# Patient Record
Sex: MALE | Race: WHITE | NOT HISPANIC OR LATINO | URBAN - METROPOLITAN AREA
[De-identification: names, ages, dates, MRNs, and addresses within clinical notes are randomized per-mention and may not be internally consistent; named-entity substitution may affect disease eponyms.]

---

## 2020-02-12 PROBLEM — Z00.00 ENCOUNTER FOR PREVENTIVE HEALTH EXAMINATION: Status: ACTIVE | Noted: 2020-02-12

## 2020-02-25 ENCOUNTER — EMERGENCY (EMERGENCY)
Facility: HOSPITAL | Age: 50
LOS: 1 days | End: 2020-02-25
Admitting: EMERGENCY MEDICINE
Payer: COMMERCIAL

## 2020-02-25 PROCEDURE — 73590 X-RAY EXAM OF LOWER LEG: CPT | Mod: 26,LT

## 2020-02-25 PROCEDURE — 99285 EMERGENCY DEPT VISIT HI MDM: CPT

## 2020-02-25 PROCEDURE — 93971 EXTREMITY STUDY: CPT | Mod: 26,LT

## 2023-02-22 ENCOUNTER — NON-APPOINTMENT (OUTPATIENT)
Age: 53
End: 2023-02-22

## 2023-02-22 ENCOUNTER — APPOINTMENT (OUTPATIENT)
Dept: CARDIOLOGY | Facility: CLINIC | Age: 53
End: 2023-02-22
Payer: COMMERCIAL

## 2023-02-22 VITALS
SYSTOLIC BLOOD PRESSURE: 130 MMHG | DIASTOLIC BLOOD PRESSURE: 82 MMHG | BODY MASS INDEX: 40.43 KG/M2 | OXYGEN SATURATION: 95 % | HEIGHT: 74 IN | HEART RATE: 74 BPM | WEIGHT: 315 LBS

## 2023-02-22 VITALS — SYSTOLIC BLOOD PRESSURE: 138 MMHG | DIASTOLIC BLOOD PRESSURE: 84 MMHG

## 2023-02-22 DIAGNOSIS — Z02.82 ENCOUNTER FOR ADOPTION SERVICES: ICD-10-CM

## 2023-02-22 DIAGNOSIS — Z78.9 OTHER SPECIFIED HEALTH STATUS: ICD-10-CM

## 2023-02-22 PROCEDURE — 99214 OFFICE O/P EST MOD 30 MIN: CPT | Mod: 25

## 2023-02-22 PROCEDURE — 93000 ELECTROCARDIOGRAM COMPLETE: CPT

## 2023-02-22 RX ORDER — LISINOPRIL AND HYDROCHLOROTHIAZIDE TABLETS 20; 12.5 MG/1; MG/1
20-12.5 TABLET ORAL DAILY
Qty: 180 | Refills: 3 | Status: ACTIVE | COMMUNITY
Start: 1900-01-01 | End: 1900-01-01

## 2023-02-22 NOTE — HISTORY OF PRESENT ILLNESS
[FreeTextEntry1] : 52 year old obese male with PMHx of HTN, HLD, SUMAN and not utilizing CPAP, lower extremity edema, presented to [facility] [place], October 2022, with chest pain. He thought the pain was originally do to a pulled muscle when he was fishing, however the pain radiated to his left shoulder/scapula area and persisted as a dull constant ache so he went to ED for further evaluation. There he had an ECG, troponins, echocardiogram. No acute findings and ruled out for an MI. Chest pain largely resolved. He had one episode a few weeks ago that resolved quickly.\par \par There is no history of MI, CVA, CHF, or previous coronary intervention.\par

## 2023-02-22 NOTE — DISCUSSION/SUMMARY
[FreeTextEntry1] : 1. Chest Pain/Abnormal ECG: advised exercise nuclear stress testing. Echocardiogram, October 2022, revealed normal LV systolic function, no significant valvular disease and borderline dilatation of RV, likely consistent with untreated SUMAN.\par \par 2. HTN: remains above goal. Goal BP less than 130/80. Recommend low salt diet. Recommend increasing lisinopril/HCTZ 20/12.5mg to 2 pills daily. Weight loss of utmost importance. \par \par 3. HLD: reviewed labs with patient from October 2022. States the statin Rx never came through from the hospital discharge. Recommend starting rosuvastatin 20mg daily.\par \par 4. Lower Extremity Edema: Lasix 20mg PRN.\par \par Follow up after testing. [EKG obtained to assist in diagnosis and management of assessed problem(s)] : EKG obtained to assist in diagnosis and management of assessed problem(s)

## 2023-02-22 NOTE — PHYSICAL EXAM
[Normal] : moves all extremities, no focal deficits, normal speech [de-identified] : No carotid bruits auscultated bilaterally [de-identified] : bilateral lower extremity edema and mild erythema

## 2023-02-22 NOTE — CARDIOLOGY SUMMARY
[de-identified] : 2/22/2023, NSR with RBBB and right axis deviation [de-identified] : 10/15/2022, LV EF 55-60%, borderline RV dilatation, mildly dilated LA, trace AI, trace TR with estimated PASP of 16mmHg.

## 2023-03-06 ENCOUNTER — APPOINTMENT (OUTPATIENT)
Dept: CARDIOLOGY | Facility: CLINIC | Age: 53
End: 2023-03-06
Payer: COMMERCIAL

## 2023-03-06 PROCEDURE — A9502: CPT

## 2023-03-06 PROCEDURE — 93015 CV STRESS TEST SUPVJ I&R: CPT

## 2023-03-06 PROCEDURE — 78452 HT MUSCLE IMAGE SPECT MULT: CPT

## 2023-03-08 ENCOUNTER — APPOINTMENT (OUTPATIENT)
Dept: CARDIOLOGY | Facility: CLINIC | Age: 53
End: 2023-03-08
Payer: COMMERCIAL

## 2023-03-08 VITALS
SYSTOLIC BLOOD PRESSURE: 146 MMHG | WEIGHT: 315 LBS | BODY MASS INDEX: 40.43 KG/M2 | HEIGHT: 74 IN | DIASTOLIC BLOOD PRESSURE: 90 MMHG | HEART RATE: 86 BPM | OXYGEN SATURATION: 96 %

## 2023-03-08 DIAGNOSIS — R94.31 ABNORMAL ELECTROCARDIOGRAM [ECG] [EKG]: ICD-10-CM

## 2023-03-08 DIAGNOSIS — R07.9 CHEST PAIN, UNSPECIFIED: ICD-10-CM

## 2023-03-08 DIAGNOSIS — R94.39 ABNORMAL RESULT OF OTHER CARDIOVASCULAR FUNCTION STUDY: ICD-10-CM

## 2023-03-08 PROCEDURE — 99215 OFFICE O/P EST HI 40 MIN: CPT

## 2023-03-08 NOTE — DISCUSSION/SUMMARY
[FreeTextEntry1] : 1. Chest Pain/Abnormal ECG/Abnormal Nuclear Stress Testing: Patient underwent exercise nuclear stress testing on 3/6/2023. SPECT images demonstrated defects. Although these defects could be caused by artifact given patient's body habitus, I am recommending a left heart catheterization to define the coronary artery anatomy given patient's multitude of risk factors for underlying CAD (HTN, obesity, untreated SUMAN, HLD, male gender). Patient agrees and wishes to proceed.  Echocardiogram, October 2022, revealed normal LV systolic function, no significant valvular disease and borderline dilatation of RV, likely consistent with untreated SUMAN.\par \par 2. HTN: Goal BP less than 130/80. Recommend low salt diet. Recommend continuing lisinopril/HCTZ 20/12.5mg to 2 pills daily. Weight loss of utmost importance. \par \par 3. HLD: reviewed labs with patient from October 2022. Continue rosuvastatin 20mg daily.\par \par 4. Lower Extremity Edema: Lasix 20mg PRN.\par \par Patient will start Aspirin 81mg daily.\par

## 2023-03-08 NOTE — HISTORY OF PRESENT ILLNESS
[FreeTextEntry1] : 52 year old obese male with PMHx of HTN, HLD, SUMAN and not utilizing CPAP, lower extremity edema, presented to [facility] [place], October 2022, with chest pain. He thought the pain was originally do to a pulled muscle when he was fishing, however the pain radiated to his left shoulder/scapula area and persisted as a dull constant ache so he went to ED for further evaluation. There he had an ECG, troponins, echocardiogram. No acute findings and ruled out for an MI. Chest pain largely resolved. He had one episode a few weeks ago that resolved quickly.\par \par There is no history of MI, CVA, CHF, or previous coronary intervention.\par

## 2023-03-08 NOTE — PHYSICAL EXAM
[Normal] : normal gait [de-identified] : No carotid bruits auscultated bilaterally [de-identified] : bilateral lower extremity edema and mild erythema

## 2023-03-08 NOTE — CARDIOLOGY SUMMARY
[de-identified] : 2/22/2023, NSR with RBBB and right axis deviation [de-identified] : 3/6/2023, Exercise Nuclear Stress Testing: exercised for 7 minutes utilizing standard Ankur Protocol. No ECG changes to suggest ischemia, however SPECT images demonstrated fixed inferior wall defect and reversible anterior wall defect. [de-identified] : 10/15/2022, LV EF 55-60%, borderline RV dilatation, mildly dilated LA, trace AI, trace TR with estimated PASP of 16mmHg.

## 2023-03-13 ENCOUNTER — NON-APPOINTMENT (OUTPATIENT)
Age: 53
End: 2023-03-13

## 2023-03-20 ENCOUNTER — APPOINTMENT (OUTPATIENT)
Dept: CARDIOLOGY | Facility: CLINIC | Age: 53
End: 2023-03-20
Payer: COMMERCIAL

## 2023-03-20 VITALS
TEMPERATURE: 97.1 F | HEART RATE: 56 BPM | BODY MASS INDEX: 40.43 KG/M2 | HEIGHT: 74 IN | DIASTOLIC BLOOD PRESSURE: 80 MMHG | WEIGHT: 315 LBS | OXYGEN SATURATION: 98 % | SYSTOLIC BLOOD PRESSURE: 136 MMHG

## 2023-03-20 PROCEDURE — 99214 OFFICE O/P EST MOD 30 MIN: CPT

## 2023-03-20 RX ORDER — ROSUVASTATIN CALCIUM 40 MG/1
40 TABLET, FILM COATED ORAL
Qty: 30 | Refills: 0 | Status: DISCONTINUED | COMMUNITY
Start: 2023-03-17

## 2023-03-20 RX ORDER — ROSUVASTATIN CALCIUM 20 MG/1
20 TABLET, FILM COATED ORAL
Qty: 90 | Refills: 3 | Status: DISCONTINUED | COMMUNITY
Start: 2023-02-22 | End: 2023-03-20

## 2023-03-20 NOTE — ASSESSMENT
[FreeTextEntry1] : CAD: The patient underwent invasive coronary angiography.  Nonobstructive disease was found.  Right radial site is healing well.\par \par Hypertriglyceridemia: Patient's triglycerides were over thousand.  He was started on rosuvastatin about 2 weeks ago.  Rosuvastatin has been discontinued.  Fenofibrate 145 mg daily has been started.  Repeat LFTs and cholesterol profile have been arranged for 6 weeks time.  The patient has been advised to abstain from alcohol.  Dietary issues have been reviewed.  The patient was will have follow-up LFTs and cholesterol profile in 6 weeks along with thyroid function testing.\par \par Hypertension: Continue current therapy.

## 2023-03-28 ENCOUNTER — NON-APPOINTMENT (OUTPATIENT)
Age: 53
End: 2023-03-28

## 2023-04-13 ENCOUNTER — NON-APPOINTMENT (OUTPATIENT)
Age: 53
End: 2023-04-13

## 2023-04-13 ENCOUNTER — APPOINTMENT (OUTPATIENT)
Dept: CARDIOLOGY | Facility: CLINIC | Age: 53
End: 2023-04-13
Payer: COMMERCIAL

## 2023-04-13 PROCEDURE — 93880 EXTRACRANIAL BILAT STUDY: CPT

## 2023-04-25 ENCOUNTER — APPOINTMENT (OUTPATIENT)
Dept: VASCULAR SURGERY | Facility: CLINIC | Age: 53
End: 2023-04-25
Payer: COMMERCIAL

## 2023-04-25 VITALS
HEIGHT: 74 IN | BODY MASS INDEX: 40.43 KG/M2 | WEIGHT: 315 LBS | DIASTOLIC BLOOD PRESSURE: 64 MMHG | SYSTOLIC BLOOD PRESSURE: 132 MMHG

## 2023-04-25 DIAGNOSIS — Z77.22 CONTACT WITH AND (SUSPECTED) EXPOSURE TO ENVIRONMENTAL TOBACCO SMOKE (ACUTE) (CHRONIC): ICD-10-CM

## 2023-04-25 PROCEDURE — 99203 OFFICE O/P NEW LOW 30 MIN: CPT

## 2023-04-25 RX ORDER — FUROSEMIDE 20 MG/1
20 TABLET ORAL DAILY
Refills: 0 | Status: DISCONTINUED | COMMUNITY
End: 2023-04-25

## 2023-04-25 RX ORDER — ICOSAPENT ETHYL 1 G/1
1 CAPSULE ORAL DAILY
Qty: 180 | Refills: 3 | Status: DISCONTINUED | COMMUNITY
Start: 2023-03-17 | End: 2023-04-25

## 2023-04-25 RX ORDER — FENOFIBRATE 145 MG/1
145 TABLET, COATED ORAL DAILY
Qty: 90 | Refills: 3 | Status: DISCONTINUED | COMMUNITY
Start: 2023-03-20 | End: 2023-04-25

## 2023-04-25 NOTE — HISTORY OF PRESENT ILLNESS
[FreeTextEntry1] : 52-year-old gentleman is being referred to be evaluated for bilateral leg edema.  Patient has developed chronic leg edema about 25 years ago.  Left leg is worse than the right.  Could not tolerate diuretics in the past.  Patient did not wear compression stockings.  Denies any history of ulcers.  No history of cellulitis.  He has long history of onychomycosis

## 2023-04-25 NOTE — REVIEW OF SYSTEMS
[Recent Weight Gain (___ Lbs)] : no recent weight gain [Recent Weight Loss (___ Lbs)] : no recent weight loss [Negative] : Integumentary

## 2023-04-25 NOTE — PHYSICAL EXAM
[JVD] : no jugular venous distention  [Carotid Bruits] : no carotid bruits [Normal Breath Sounds] : Normal breath sounds [Normal Heart Sounds] : normal heart sounds [2+] : left 2+ [Ankle Swelling (On Exam)] : present [Ankle Swelling Bilaterally] : severe [Varicose Veins Of Lower Extremities] : not present [] : not present [Purpura] : purpura [Skin Ulcer] : no ulcer [Skin Induration] : induration [de-identified] : Morbidly overweight, very pleasant, no acute distress. [FreeTextEntry1] : Lymphedema both lower extremities left worse than the right.

## 2023-04-27 ENCOUNTER — APPOINTMENT (OUTPATIENT)
Dept: VASCULAR SURGERY | Facility: CLINIC | Age: 53
End: 2023-04-27
Payer: COMMERCIAL

## 2023-04-27 PROCEDURE — 93970 EXTREMITY STUDY: CPT

## 2023-05-03 ENCOUNTER — APPOINTMENT (OUTPATIENT)
Dept: VASCULAR SURGERY | Facility: CLINIC | Age: 53
End: 2023-05-03
Payer: COMMERCIAL

## 2023-05-03 VITALS
BODY MASS INDEX: 40.43 KG/M2 | OXYGEN SATURATION: 97 % | HEART RATE: 64 BPM | SYSTOLIC BLOOD PRESSURE: 160 MMHG | DIASTOLIC BLOOD PRESSURE: 88 MMHG | HEIGHT: 74 IN | WEIGHT: 315 LBS

## 2023-05-03 PROCEDURE — 99213 OFFICE O/P EST LOW 20 MIN: CPT

## 2023-05-03 NOTE — REVIEW OF SYSTEMS
[Chest Pain] : no chest pain [Palpitations] : no palpitations [Leg Claudication] : no intermittent leg claudication [Lower Ext Edema] : lower extremity edema [Limb Swelling] : limb swelling [Negative] : Integumentary

## 2023-05-03 NOTE — HISTORY OF PRESENT ILLNESS
[FreeTextEntry1] : Patient is a follow-up visit for bilateral leg edema.  Recent venous ultrasound was negative for DVT.  Patient has peripheral venous insufficiency.

## 2023-05-03 NOTE — ASSESSMENT
[FreeTextEntry1] : Patient was recommended weight loss, compression stockings and lymphedema pump.  Follow-up appointment in 4 months.

## 2023-05-03 NOTE — PHYSICAL EXAM
[JVD] : no jugular venous distention  [Normal Breath Sounds] : Normal breath sounds [Normal Heart Sounds] : normal heart sounds [2+] : left 2+ [Ankle Swelling (On Exam)] : present [Ankle Swelling Bilaterally] : bilaterally  [Ankle Swelling On The Left] : moderate [Varicose Veins Of Lower Extremities] : not present [] : not present [No Rash or Lesion] : No rash or lesion [de-identified] : Morbidly overweight, pleasant no acute distress.

## 2023-05-11 ENCOUNTER — APPOINTMENT (OUTPATIENT)
Dept: CARDIOLOGY | Facility: CLINIC | Age: 53
End: 2023-05-11
Payer: COMMERCIAL

## 2023-05-11 VITALS
HEIGHT: 74 IN | DIASTOLIC BLOOD PRESSURE: 82 MMHG | WEIGHT: 315 LBS | BODY MASS INDEX: 40.43 KG/M2 | HEART RATE: 66 BPM | SYSTOLIC BLOOD PRESSURE: 142 MMHG | OXYGEN SATURATION: 96 %

## 2023-05-11 DIAGNOSIS — I10 ESSENTIAL (PRIMARY) HYPERTENSION: ICD-10-CM

## 2023-05-11 DIAGNOSIS — I25.10 ATHEROSCLEROTIC HEART DISEASE OF NATIVE CORONARY ARTERY W/OUT ANGINA PECTORIS: ICD-10-CM

## 2023-05-11 DIAGNOSIS — E78.1 PURE HYPERGLYCERIDEMIA: ICD-10-CM

## 2023-05-11 DIAGNOSIS — E78.00 PURE HYPERCHOLESTEROLEMIA, UNSPECIFIED: ICD-10-CM

## 2023-05-11 DIAGNOSIS — M79.89 OTHER SPECIFIED SOFT TISSUE DISORDERS: ICD-10-CM

## 2023-05-11 DIAGNOSIS — I65.29 OCCLUSION AND STENOSIS OF UNSPECIFIED CAROTID ARTERY: ICD-10-CM

## 2023-05-11 PROCEDURE — 99214 OFFICE O/P EST MOD 30 MIN: CPT

## 2023-05-11 RX ORDER — ICOSAPENT ETHYL 1 G/1
1 CAPSULE ORAL DAILY
Qty: 180 | Refills: 3 | Status: ACTIVE | COMMUNITY
Start: 2023-05-11 | End: 1900-01-01

## 2023-05-11 NOTE — PHYSICAL EXAM
[Normal] : moves all extremities, no focal deficits, normal speech [de-identified] : No carotid bruits auscultated bilaterally [de-identified] : bilateral lower extremity edema and mild erythema

## 2023-05-11 NOTE — CARDIOLOGY SUMMARY
[de-identified] : 2/22/2023, NSR with RBBB and right axis deviation [de-identified] : 3/6/2023, Exercise Nuclear Stress Testing: exercised for 7 minutes utilizing standard Ankur Protocol. No ECG changes to suggest ischemia, however SPECT images demonstrated fixed inferior wall defect and reversible anterior wall defect. [de-identified] : 10/15/2022, LV EF 55-60%, borderline RV dilatation, mildly dilated LA, trace AI, trace TR with estimated PASP of 16mmHg. [de-identified] : 3/17/2023, mild atherosclerosis throughout coronary arteries. No obstructive disease.  [de-identified] : 4/13/2023, Carotid Duplex: mild atherosclerosis bilaterally.

## 2023-05-11 NOTE — HISTORY OF PRESENT ILLNESS
[FreeTextEntry1] : 52 year old obese male with PMHx of HTN, HLD, SUMAN and not utilizing CPAP, lower extremity edema, presented to [facility] [place], October 2022, with chest pain. He thought the pain was originally do to a pulled muscle when he was fishing, however the pain radiated to his left shoulder/scapula area and persisted as a dull constant ache so he went to ED for further evaluation. There he had an ECG, troponins, echocardiogram. No acute findings and ruled out for an MI. Chest pain largely resolved. He had one episode a few weeks ago that resolved quickly.\par \par There is no history of MI, CVA, CHF, or previous coronary intervention.\par

## 2023-05-11 NOTE — DISCUSSION/SUMMARY
[FreeTextEntry1] : 1. Coronary Artery Disease: Patient underwent exercise nuclear stress testing on 3/6/2023. SPECT images demonstrated defects. Although these defects could be caused by artifact given patient's body habitus, I recommended a left heart catheterization to define the coronary artery anatomy. Patient underwent cardiac catheterization, 3/17/2023, which revealed mild atherosclerosis throughout the coronary arteries. No obstructive disease. Recommend resuming rosuvastatin 20mg daily. Goal LDL less than 70. Echocardiogram, October 2022, revealed normal LV systolic function, no significant valvular disease and borderline dilatation of RV, likely consistent with untreated SUMAN.\par \par 2. HTN: Goal BP less than 130/80. Recommend low salt diet. Recommend continuing lisinopril/HCTZ 20/12.5mg to 2 pills daily. Weight loss of utmost importance. \par \par 3. HLD: reviewed labs with patient from 3/15/2023. Triglycerides over 1000. Discussed importance of heart healthy diet, aerobic exercise and weight loss. Continue icosapent ethyl 1gm 2pills daily Continue rosuvastatin 20mg daily. Will check fasting lipid panel in 2 weeks.\par \par 4. Lower Extremity Edema: followed up with Dr. St (vascular specialist). No DVT on venous duplex from 4/27/2023. Recommended weight loss, compression stockings and lymphedema pump.\par \par Follow up in 6 months.\par

## 2023-09-05 ENCOUNTER — APPOINTMENT (OUTPATIENT)
Dept: VASCULAR SURGERY | Facility: CLINIC | Age: 53
End: 2023-09-05
Payer: COMMERCIAL

## 2023-09-05 VITALS
BODY MASS INDEX: 40.43 KG/M2 | DIASTOLIC BLOOD PRESSURE: 96 MMHG | OXYGEN SATURATION: 99 % | WEIGHT: 315 LBS | SYSTOLIC BLOOD PRESSURE: 128 MMHG | HEIGHT: 74 IN | HEART RATE: 77 BPM

## 2023-09-05 DIAGNOSIS — I89.0 LYMPHEDEMA, NOT ELSEWHERE CLASSIFIED: ICD-10-CM

## 2023-09-05 DIAGNOSIS — E66.01 MORBID (SEVERE) OBESITY DUE TO EXCESS CALORIES: ICD-10-CM

## 2023-09-05 DIAGNOSIS — I87.2 VENOUS INSUFFICIENCY (CHRONIC) (PERIPHERAL): ICD-10-CM

## 2023-09-05 PROCEDURE — 99213 OFFICE O/P EST LOW 20 MIN: CPT

## 2023-09-05 RX ORDER — OMEGA-3/DHA/EPA/FISH OIL 1200 MG
1200 CAPSULE ORAL
Refills: 0 | Status: ACTIVE | COMMUNITY

## 2023-09-05 RX ORDER — ROSUVASTATIN CALCIUM 20 MG/1
20 TABLET, FILM COATED ORAL
Qty: 90 | Refills: 3 | Status: DISCONTINUED | COMMUNITY
Start: 2023-05-11 | End: 2023-09-05

## 2023-09-05 NOTE — PHYSICAL EXAM
[JVD] : no jugular venous distention  [Normal Breath Sounds] : Normal breath sounds [Normal Heart Sounds] : normal heart sounds [2+] : left 2+ [Ankle Swelling (On Exam)] : present [Ankle Swelling Bilaterally] : bilaterally  [Ankle Swelling On The Left] : moderate [Varicose Veins Of Lower Extremities] : not present [] : not present [Abdomen Masses] : No abdominal masses [Abdomen Tenderness] : ~T ~M No abdominal tenderness [No Rash or Lesion] : No rash or lesion [de-identified] : Markedly overweight, no distress

## 2023-09-05 NOTE — REVIEW OF SYSTEMS
[Leg Claudication] : no intermittent leg claudication [Lower Ext Edema] : lower extremity edema [Limb Pain] : limb pain [Limb Swelling] : limb swelling [Skin Lesions] : no skin lesions [Skin Wound] : no skin wound [Negative] : Respiratory

## 2023-09-05 NOTE — ASSESSMENT
[FreeTextEntry1] : Patient was given a prescription for Velcro compression wraps.  He was also recommended to lose some weight.  Follow-up appointment in 4 months.

## 2023-09-05 NOTE — HISTORY OF PRESENT ILLNESS
[FreeTextEntry1] : Patient is follow-up for bilateral leg edema and venous insufficiency.  He has been using lymphedema pumps.  There is no significant improvement.  Patient has not been wearing compression stockings.

## 2023-09-12 ENCOUNTER — APPOINTMENT (OUTPATIENT)
Dept: CARDIOLOGY | Facility: CLINIC | Age: 53
End: 2023-09-12

## 2023-11-09 ENCOUNTER — APPOINTMENT (OUTPATIENT)
Dept: CARDIOLOGY | Facility: CLINIC | Age: 53
End: 2023-11-09

## 2024-01-05 ENCOUNTER — APPOINTMENT (OUTPATIENT)
Dept: VASCULAR SURGERY | Facility: CLINIC | Age: 54
End: 2024-01-05